# Patient Record
Sex: MALE | Race: WHITE | NOT HISPANIC OR LATINO | URBAN - METROPOLITAN AREA
[De-identification: names, ages, dates, MRNs, and addresses within clinical notes are randomized per-mention and may not be internally consistent; named-entity substitution may affect disease eponyms.]

---
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
## 2023-06-22 NOTE — ED PEDIATRIC NURSE NOTE - DISTAL EXTREMITY CAPILLARY REFILL
----- Message from Erika Shane MD sent at 6/22/2023  9:04 AM CDT -----  Call patient with normal results.     2 seconds or less
